# Patient Record
Sex: FEMALE | Race: WHITE | URBAN - METROPOLITAN AREA
[De-identification: names, ages, dates, MRNs, and addresses within clinical notes are randomized per-mention and may not be internally consistent; named-entity substitution may affect disease eponyms.]

---

## 2022-10-10 PROBLEM — Z00.00 ENCOUNTER FOR PREVENTIVE HEALTH EXAMINATION: Status: ACTIVE | Noted: 2022-10-10

## 2022-10-15 ENCOUNTER — OUTPATIENT (OUTPATIENT)
Dept: OUTPATIENT SERVICES | Facility: HOSPITAL | Age: 62
LOS: 1 days | End: 2022-10-15

## 2022-10-15 ENCOUNTER — APPOINTMENT (OUTPATIENT)
Dept: OBGYN | Facility: HOSPITAL | Age: 62
End: 2022-10-15

## 2022-10-15 ENCOUNTER — RESULT REVIEW (OUTPATIENT)
Age: 62
End: 2022-10-15

## 2022-10-15 ENCOUNTER — OUTPATIENT (OUTPATIENT)
Dept: OUTPATIENT SERVICES | Facility: HOSPITAL | Age: 62
LOS: 1 days | End: 2022-10-15
Payer: COMMERCIAL

## 2022-10-15 ENCOUNTER — APPOINTMENT (OUTPATIENT)
Dept: MAMMOGRAPHY | Facility: IMAGING CENTER | Age: 62
End: 2022-10-15

## 2022-10-15 DIAGNOSIS — Z00.8 ENCOUNTER FOR OTHER GENERAL EXAMINATION: ICD-10-CM

## 2022-10-15 DIAGNOSIS — Z12.39 ENCOUNTER FOR OTHER SCREENING FOR MALIGNANT NEOPLASM OF BREAST: ICD-10-CM

## 2022-10-15 DIAGNOSIS — Z11.51 ENCOUNTER FOR SCREENING FOR HUMAN PAPILLOMAVIRUS (HPV): ICD-10-CM

## 2022-10-15 DIAGNOSIS — Z12.4 ENCOUNTER FOR SCREENING FOR MALIGNANT NEOPLASM OF CERVIX: ICD-10-CM

## 2022-10-15 PROCEDURE — 77063 BREAST TOMOSYNTHESIS BI: CPT

## 2022-10-15 PROCEDURE — 77063 BREAST TOMOSYNTHESIS BI: CPT | Mod: 26

## 2022-10-15 PROCEDURE — 77067 SCR MAMMO BI INCL CAD: CPT | Mod: 26

## 2022-10-15 PROCEDURE — 77067 SCR MAMMO BI INCL CAD: CPT

## 2022-10-17 LAB
HPV HIGH+LOW RISK DNA PNL CVX: SIGNIFICANT CHANGE UP

## 2022-10-19 LAB
CYTOLOGY SPEC DOC CYTO: SIGNIFICANT CHANGE UP

## 2023-10-23 ENCOUNTER — APPOINTMENT (OUTPATIENT)
Dept: OTHER | Facility: CLINIC | Age: 63
End: 2023-10-23

## 2023-10-23 VITALS
OXYGEN SATURATION: 96 % | WEIGHT: 170 LBS | DIASTOLIC BLOOD PRESSURE: 86 MMHG | SYSTOLIC BLOOD PRESSURE: 124 MMHG | HEIGHT: 68 IN | HEART RATE: 83 BPM | BODY MASS INDEX: 25.76 KG/M2 | TEMPERATURE: 98.2 F

## 2023-10-23 DIAGNOSIS — Z83.3 FAMILY HISTORY OF DIABETES MELLITUS: ICD-10-CM

## 2023-10-23 DIAGNOSIS — Z82.3 FAMILY HISTORY OF STROKE: ICD-10-CM

## 2023-10-23 DIAGNOSIS — Z80.3 FAMILY HISTORY OF MALIGNANT NEOPLASM OF BREAST: ICD-10-CM

## 2023-10-23 DIAGNOSIS — Z82.49 FAMILY HISTORY OF ISCHEMIC HEART DISEASE AND OTHER DISEASES OF THE CIRCULATORY SYSTEM: ICD-10-CM

## 2023-10-23 DIAGNOSIS — T14.8XXA OTHER INJURY OF UNSPECIFIED BODY REGION, INITIAL ENCOUNTER: ICD-10-CM

## 2023-10-23 PROBLEM — Z00.00 ENCOUNTER FOR PREVENTIVE HEALTH EXAMINATION: Status: ACTIVE | Noted: 2023-10-23

## 2023-11-03 ENCOUNTER — APPOINTMENT (OUTPATIENT)
Dept: OBGYN | Facility: HOSPITAL | Age: 63
End: 2023-11-03

## 2023-11-07 ENCOUNTER — NON-APPOINTMENT (OUTPATIENT)
Age: 63
End: 2023-11-07

## 2023-11-07 ENCOUNTER — TRANSCRIPTION ENCOUNTER (OUTPATIENT)
Age: 63
End: 2023-11-07

## 2023-11-20 ENCOUNTER — CLINICAL ADVICE (OUTPATIENT)
Age: 63
End: 2023-11-20

## 2023-11-20 LAB
ALBUMIN SERPL ELPH-MCNC: 4.6 G/DL
ALP BLD-CCNC: 117 U/L
ALT SERPL-CCNC: 22 U/L
ANION GAP SERPL CALC-SCNC: 16 MMOL/L
AST SERPL-CCNC: 23 U/L
BASOPHILS # BLD AUTO: 0.06 K/UL
BASOPHILS NFR BLD AUTO: 0.7 %
BILIRUB SERPL-MCNC: 0.3 MG/DL
BUN SERPL-MCNC: 20 MG/DL
CALCIUM SERPL-MCNC: 10.2 MG/DL
CHLORIDE SERPL-SCNC: 104 MMOL/L
CHOLEST SERPL-MCNC: 329 MG/DL
CO2 SERPL-SCNC: 21 MMOL/L
CREAT SERPL-MCNC: 0.8 MG/DL
EGFR: 84 ML/MIN/1.73M2
EOSINOPHIL # BLD AUTO: 0.28 K/UL
EOSINOPHIL NFR BLD AUTO: 3.1 %
GLUCOSE SERPL-MCNC: 119 MG/DL
HCT VFR BLD CALC: 40.5 %
HDLC SERPL-MCNC: 43 MG/DL
HGB BLD-MCNC: 13.1 G/DL
IMM GRANULOCYTES NFR BLD AUTO: 0.2 %
LDLC SERPL CALC-MCNC: 163 MG/DL
LYMPHOCYTES # BLD AUTO: 2.33 K/UL
LYMPHOCYTES NFR BLD AUTO: 25.7 %
MAN DIFF?: NORMAL
MCHC RBC-ENTMCNC: 29.8 PG
MCHC RBC-ENTMCNC: 32.3 GM/DL
MCV RBC AUTO: 92 FL
MONOCYTES # BLD AUTO: 0.6 K/UL
MONOCYTES NFR BLD AUTO: 6.6 %
NEUTROPHILS # BLD AUTO: 5.76 K/UL
NEUTROPHILS NFR BLD AUTO: 63.7 %
NONHDLC SERPL-MCNC: 286 MG/DL
PLATELET # BLD AUTO: 304 K/UL
POTASSIUM SERPL-SCNC: 5.1 MMOL/L
PROT SERPL-MCNC: 7.1 G/DL
RBC # BLD: 4.4 M/UL
RBC # FLD: 12.6 %
SODIUM SERPL-SCNC: 141 MMOL/L
TRIGL SERPL-MCNC: 614 MG/DL
WBC # FLD AUTO: 9.05 K/UL

## 2024-01-22 ENCOUNTER — APPOINTMENT (OUTPATIENT)
Dept: OTHER | Facility: CLINIC | Age: 64
End: 2024-01-22

## 2024-01-22 VITALS
HEIGHT: 68 IN | OXYGEN SATURATION: 96 % | BODY MASS INDEX: 25.76 KG/M2 | DIASTOLIC BLOOD PRESSURE: 88 MMHG | HEART RATE: 81 BPM | SYSTOLIC BLOOD PRESSURE: 134 MMHG | WEIGHT: 170 LBS

## 2024-01-22 NOTE — HISTORY OF PRESENT ILLNESS
[de-identified] : Patient is a 63-year-old woman presenting for a 3 month f/u for high cholesterol currently treated with Rosuvastatin PO qday (unsure dose). Patient said she has had high cholesterol "her whole life." Patient said her cholesterol was 329 on 10/31/23. She saw a cardiologist on 10/18/2023 due to the elevated cholesterol. The patient said the cardiologist ordered blood wok and prescribed rosuvastatin, has been taking for approximately 3 months.   Patient also reports some new spots and "growths" on her skin which she attributes to aging. She also reports having compacted ear wax which makes it difficult for her to hear out of her right ear. She would like to get a f/u with an in person attending to wash out her impacted ear wax.  On a typical day, patient said she has a couple cups of decaf coffee for breakfast, 1/2 a tortilla with hummus for lunch, and protein (chicken, beef, veal, fish), rice/pasta, and a vegetable (peas, onions, green beans) for dinner. Patient said she walks 1 mile every day when it is not raining.  Patient said she typically has 6 drinks of alcohol per week. She consumes between 1-3 drinks on Friday, Saturday, and Sunday. Patient said she smokes 3.5 cigarettes per day and is aiming to quit by February 2024. Patient said she has been smoking for 30+ years and cut down to 7 cigarettes/day in 1992. She said she has smoked 1/4 pack a day for the past 5 years. Patient said she coughs up phlegm in the mornings and cannot swim underwater for as long as she used to. Patient used some cocaine in college but hasn't used any recreational drugs in the past 35 years.  Past med/surg: Torn PCL in left knee Tonsils out (age 8) D&C (1997) for uterine fibroids Has had 2 episodes of hypotension (once recently while giving blood)  Family history: Stroke (grandfather) MI (2 cousins aged 48, father (age 87), uncle, grandfather) Breast cancer (mother age 55, maternal grandmother) Diabetes II (father, maternal grandmother)  Medications: Rosuvastatin PO qday (unknown dose)  Allergic to pollen (hay fever), niacin (whole body started itching). [FreeTextEntry1] : 3 month f/u on Rosuvastatin for high cholesterol

## 2024-01-22 NOTE — ASSESSMENT
[FreeTextEntry1] : #Reported high cholesterol - On Rosuvastatin for 3 months, here for f/u lipid panel. Patient fasted since last night.  - Lipid panel drawn and ordered on 1/22  #Skin lesions -Skin lesions were measured during visit. Patient can follow-up at the clinic in a few months to determine whether the lesions have changed in size, shape, texture, or color.  #Impacted ear wax in right ear -Patient can schedule a follow-up appointment at the clinic when a physician is present in-person for wax removal. F/u scheduled for March.  Plan discussed with Dr. Bello

## 2024-01-22 NOTE — PHYSICAL EXAM
[Normal] : normal rate, regular rhythm, normal S1 and S2 and no murmur heard [de-identified] : 2 moles measured.  0.5 cm x 1.5 cm lesion on right side of head. 0.75 cm x 0.5 cm lesion on right leg.

## 2024-02-05 ENCOUNTER — NON-APPOINTMENT (OUTPATIENT)
Age: 64
End: 2024-02-05

## 2024-02-12 ENCOUNTER — CLINICAL ADVICE (OUTPATIENT)
Age: 64
End: 2024-02-12

## 2024-02-12 LAB
CHOLEST SERPL-MCNC: 263 MG/DL
HDLC SERPL-MCNC: 60 MG/DL
LDLC SERPL CALC-MCNC: 165 MG/DL
NONHDLC SERPL-MCNC: 202 MG/DL
TRIGL SERPL-MCNC: 204 MG/DL

## 2024-03-04 ENCOUNTER — APPOINTMENT (OUTPATIENT)
Dept: OTHER | Facility: CLINIC | Age: 64
End: 2024-03-04

## 2024-03-14 ENCOUNTER — APPOINTMENT (OUTPATIENT)
Dept: OTOLARYNGOLOGY | Facility: CLINIC | Age: 64
End: 2024-03-14

## 2024-05-06 ENCOUNTER — APPOINTMENT (OUTPATIENT)
Dept: OTHER | Facility: CLINIC | Age: 64
End: 2024-05-06

## 2024-05-06 VITALS
OXYGEN SATURATION: 96 % | RESPIRATION RATE: 16 BRPM | HEART RATE: 84 BPM | SYSTOLIC BLOOD PRESSURE: 127 MMHG | DIASTOLIC BLOOD PRESSURE: 86 MMHG | TEMPERATURE: 98.1 F

## 2024-05-06 DIAGNOSIS — E78.5 HYPERLIPIDEMIA, UNSPECIFIED: ICD-10-CM

## 2024-05-06 RX ORDER — ROSUVASTATIN CALCIUM 20 MG/1
20 TABLET, FILM COATED ORAL DAILY
Qty: 30 | Refills: 3 | Status: ACTIVE | COMMUNITY
Start: 2024-05-06 | End: 1900-01-01

## 2024-05-07 LAB
ALBUMIN SERPL ELPH-MCNC: 4.5 G/DL
ALP BLD-CCNC: 106 U/L
ALT SERPL-CCNC: 23 U/L
ANION GAP SERPL CALC-SCNC: 16 MMOL/L
AST SERPL-CCNC: 24 U/L
BILIRUB SERPL-MCNC: 0.5 MG/DL
BUN SERPL-MCNC: 16 MG/DL
CALCIUM SERPL-MCNC: 10.3 MG/DL
CHLORIDE SERPL-SCNC: 104 MMOL/L
CHOLEST SERPL-MCNC: 232 MG/DL
CO2 SERPL-SCNC: 20 MMOL/L
CREAT SERPL-MCNC: 0.69 MG/DL
EGFR: 98 ML/MIN/1.73M2
ESTIMATED AVERAGE GLUCOSE: 117 MG/DL
GLUCOSE SERPL-MCNC: 94 MG/DL
HBA1C MFR BLD HPLC: 5.7 %
HDLC SERPL-MCNC: 56 MG/DL
LDLC SERPL CALC-MCNC: 148 MG/DL
NONHDLC SERPL-MCNC: 177 MG/DL
POTASSIUM SERPL-SCNC: 4.1 MMOL/L
PROT SERPL-MCNC: 7.4 G/DL
SODIUM SERPL-SCNC: 139 MMOL/L
TRIGL SERPL-MCNC: 161 MG/DL

## 2024-05-07 NOTE — ASSESSMENT
[FreeTextEntry1] : This is a 64-year-old female with a PMH of HLD presenting for follow-up of cholesterol, statin refill, and ear wax removal.

## 2024-05-07 NOTE — ADDENDUM
[FreeTextEntry1] : Pt seen and examined. Reviewed history and performed PE myself. Assisted student in otoscope examination and ear irrigation. Agree wit A/P as written.

## 2024-05-07 NOTE — HISTORY OF PRESENT ILLNESS
[FreeTextEntry1] : cholesterol follow up [de-identified] : This is a 64-year-old female with a past medical history of HLD presenting for follow-up on her cholesterol blood work and statin refill. She states she is exercising and generally eating well. She is also hear for ear wax removal as she states her hearing sounds muffled at times. She also would like her moles to be checked as they were measured last time she was in the clinic. She feels well overall and denies any recent changes to her medical history. She has had no recent illness.    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

## 2024-05-07 NOTE — PHYSICAL EXAM
[Clear to Auscultation] : lungs were clear to auscultation bilaterally [No Edema] : there was no peripheral edema [No Extremity Clubbing/Cyanosis] : no extremity clubbing/cyanosis [Normal] : affect was normal and insight and judgment were intact [de-identified] : Right TM normal, left ear withj moderate wax [de-identified] : Brown papule on right scalp measuring [id number], Brown papule on right calf measuring .5cmx.5cm

## 2024-05-07 NOTE — PLAN
[FreeTextEntry1] : Problem 1: Hyperlipidemia Plan: repeat lipid panel 3 months ago showed triglycerides markedly decreased, HDL increase, however LDL remained elevated at 165, she states she is exercising and eating healthier, she has a strong family history of HLD, ASCVD risk algorithm score 9% based on patient factors and most recent lipid studies  - increase rosuvastatin to 20mg -  on dietary and lifestyle modifications - repeat lipid panel today  Problem 2: Ear wax build up Plan: she states hearing is muffled at times, on exam left ear is with moderate ear wax build up - irrigated ear wax in office today - recommend trying OTC allergy medications  Problem 3: Skin lesions Plan: she is presenting with skin lesions that were noted on her last visit to the clinic, they are brown papules, one on the right scalp and one on right leg, she states they have been present for years, she does not think they are changing shape or getting bigger, they are not itchy or painful, they are likely seborrheic keratosis  - skin lesions measured in clinic today - papule on right scalp noted to be [id number] today, last visit it was 1.5cm by .5cm, right leg lesion measured .5x.5cm today (down from .75cmx.5cm) - continue to trend lesions for evolution in appearance and size at future visits, if changes are noted will need dermatology follow-up  Problem 4: Screening Plan: up to date on mammo and pap, last colonoscopy was 10 years ago - was a normal study, never had A1c testing done, both parents have T2DM - obtain baseline A1c - at next visit provide fecal occult blood test to screen for colon CA  Problem 5: Smoking Plan: she smokes 4-5 cigarettes per day for past 25 years, 1/2 pack a day for 20 years prior to that, assessed motivation for stopping cigarettes, she said she is interested in the future during a time when she is less stressed - continue to assess motivation for stopping smoking at future clinic visits - continue to provide support and resources for smoking cessation if she desires to attempt quitting

## 2024-08-05 ENCOUNTER — APPOINTMENT (OUTPATIENT)
Dept: OTHER | Facility: CLINIC | Age: 64
End: 2024-08-05

## 2024-08-06 NOTE — HISTORY OF PRESENT ILLNESS
[FreeTextEntry1] : follow up after medication dosage change [de-identified] : Patient is a 65 y/o woman with history of hyperlipidemia and preDM, presenting to the clinic for a follow up appointment 3 months after rosuvastatin dosage was increased from 10 mg to 20 mg. Patient reports feeling well with no recent health changes/concerns. She notes no side effects from the medications and has no trouble obtaining/taking her medications. Patient states she is trying to exercise more by walking and making diet changes to improve her cholesterol. Patient adds she has chronic skin lesions on her scalp and leg that have been monitored in the clinic, which have not increased in size. She reports a new skin lesion to her upper back that is small and painful to touch, which has been present for the past 1 month. She also mentions she would like to eventually quit smoking, however does not feel ready at this time. Patient reports she is up to date with her mammogram and pap smear, however believes she is due for a colonoscopy, as her last one was about 10 years ago. Patient declined the RSV and shingles vaccine at this time. She is up to date with tetanus vaccination.   Past med/surg: Torn PCL in left knee Tonsils out (age 8) D&C (1997) for uterine fibroids Has had 2 episodes of hypotension (once recently while giving blood)  Family history: Stroke (grandfather) MI (2 cousins aged 48, father (age 87), uncle, grandfather) Breast cancer (mother age 55, maternal grandmother) Diabetes II (father, maternal grandmother)  Soc Hx:  - smoking quarter pack per day  - alcohol: ~6 drinks per week  Medications: rosuvastatin 20 mg QHS  Allergic to pollen (hay fever), niacin (whole body started itching).

## 2024-08-06 NOTE — PHYSICAL EXAM
[No Acute Distress] : no acute distress [Well Nourished] : well nourished [Well Developed] : well developed [Well-Appearing] : well-appearing [Normal Sclera/Conjunctiva] : normal sclera/conjunctiva [PERRL] : pupils equal round and reactive to light [EOMI] : extraocular movements intact [Normal Outer Ear/Nose] : the outer ears and nose were normal in appearance [Normal Oropharynx] : the oropharynx was normal [No JVD] : no jugular venous distention [No Lymphadenopathy] : no lymphadenopathy [Supple] : supple [Thyroid Normal, No Nodules] : the thyroid was normal and there were no nodules present [No Respiratory Distress] : no respiratory distress  [No Accessory Muscle Use] : no accessory muscle use [Clear to Auscultation] : lungs were clear to auscultation bilaterally [Normal Rate] : normal rate  [Regular Rhythm] : with a regular rhythm [Normal S1, S2] : normal S1 and S2 [No Murmur] : no murmur heard [No Carotid Bruits] : no carotid bruits [No Abdominal Bruit] : a ~M bruit was not heard ~T in the abdomen [No Varicosities] : no varicosities [Pedal Pulses Present] : the pedal pulses are present [No Edema] : there was no peripheral edema [No Palpable Aorta] : no palpable aorta [No Extremity Clubbing/Cyanosis] : no extremity clubbing/cyanosis [Soft] : abdomen soft [Non Tender] : non-tender [Non-distended] : non-distended [No Masses] : no abdominal mass palpated [No HSM] : no HSM [Normal Bowel Sounds] : normal bowel sounds [Normal Posterior Cervical Nodes] : no posterior cervical lymphadenopathy [Normal Anterior Cervical Nodes] : no anterior cervical lymphadenopathy [No CVA Tenderness] : no CVA  tenderness [No Spinal Tenderness] : no spinal tenderness [No Joint Swelling] : no joint swelling [Grossly Normal Strength/Tone] : grossly normal strength/tone [Coordination Grossly Intact] : coordination grossly intact [No Focal Deficits] : no focal deficits [Normal Gait] : normal gait [Deep Tendon Reflexes (DTR)] : deep tendon reflexes were 2+ and symmetric [Normal Affect] : the affect was normal [Normal Insight/Judgement] : insight and judgment were intact [de-identified] : 1 cm x 1.5 cm lesion on right side of head. 0.75 cm x 0.5 cm lesion on right leg. 0.5 x 0.5 cm round, brown, raised, verrucous lesion to the upper back

## 2024-08-06 NOTE — END OF VISIT
[] : A student assisted with documenting this visit. I have reviewed and verified all information documented by the student, and made modifications to such information, when appropriate. [FreeTextEntry3] : Seen and examined with stated.  Agree with above.

## 2024-08-06 NOTE — REVIEW OF SYSTEMS
[Night Sweats] : night sweats [Negative] : Heme/Lymph [Fever] : no fever [Chills] : no chills [Fatigue] : no fatigue [Recent Change In Weight] : ~T no recent weight change [FreeTextEntry2] : patient states she has always had night sweats

## 2024-08-06 NOTE — PLAN
[FreeTextEntry1] : Health maintenance:  #hyperlipidemia  - repeat A1C and lipid panel ordered  - continue life style changes  #cancer screening  - patient given referral for colonoscopy   #skin lesions  - Skin lesions were remeasured during this visit. Patient can follow up in the clinic to determine whether the lesions have changed in size, shape, texture and color.  - New upper back lesion appears like seborrheic keratosis. Continue to monitor.   Follow up in 4 months

## 2024-08-06 NOTE — ASSESSMENT
[FreeTextEntry1] : Patient is a 63 y/o woman with hyperlipidemia and preDM, who presents to the clinic for follow up after rosuvastatin dose increase from 10 mg to 20 mg, 3 months ago and concern for new skin lesion. Patient otherwise feels well and has benign appearing lesions. Continue routine health maintenance.

## 2024-10-03 ENCOUNTER — CLINICAL ADVICE (OUTPATIENT)
Age: 64
End: 2024-10-03

## 2024-10-15 ENCOUNTER — NON-APPOINTMENT (OUTPATIENT)
Age: 64
End: 2024-10-15

## 2025-03-24 ENCOUNTER — APPOINTMENT (OUTPATIENT)
Dept: OTHER | Facility: CLINIC | Age: 65
End: 2025-03-24

## 2025-03-24 VITALS
OXYGEN SATURATION: 97 % | SYSTOLIC BLOOD PRESSURE: 132 MMHG | HEIGHT: 68 IN | DIASTOLIC BLOOD PRESSURE: 82 MMHG | BODY MASS INDEX: 26.39 KG/M2 | RESPIRATION RATE: 14 BRPM | WEIGHT: 174.13 LBS | HEART RATE: 78 BPM